# Patient Record
Sex: FEMALE | Race: WHITE | NOT HISPANIC OR LATINO | URBAN - METROPOLITAN AREA
[De-identification: names, ages, dates, MRNs, and addresses within clinical notes are randomized per-mention and may not be internally consistent; named-entity substitution may affect disease eponyms.]

---

## 2023-11-27 ENCOUNTER — APPOINTMENT (OUTPATIENT)
Dept: NEUROLOGY | Facility: CLINIC | Age: 38
End: 2023-11-27
Payer: COMMERCIAL

## 2023-12-30 DIAGNOSIS — K21.9 GASTROESOPHAGEAL REFLUX DISEASE WITHOUT ESOPHAGITIS: Primary | ICD-10-CM

## 2024-01-01 RX ORDER — OMEPRAZOLE 40 MG/1
40 CAPSULE, DELAYED RELEASE ORAL DAILY
Qty: 30 CAPSULE | Refills: 0 | Status: SHIPPED | OUTPATIENT
Start: 2024-01-01

## 2024-01-04 ENCOUNTER — SPECIALTY PHARMACY (OUTPATIENT)
Dept: PHARMACY | Facility: CLINIC | Age: 39
End: 2024-01-04

## 2024-01-04 PROCEDURE — RXMED WILLOW AMBULATORY MEDICATION CHARGE

## 2024-01-05 ENCOUNTER — PHARMACY VISIT (OUTPATIENT)
Dept: PHARMACY | Facility: CLINIC | Age: 39
End: 2024-01-05
Payer: MEDICAID

## 2024-01-18 ENCOUNTER — APPOINTMENT (OUTPATIENT)
Dept: NEUROLOGY | Facility: HOSPITAL | Age: 39
End: 2024-01-18
Payer: COMMERCIAL

## 2024-02-01 ENCOUNTER — SPECIALTY PHARMACY (OUTPATIENT)
Dept: PHARMACY | Facility: CLINIC | Age: 39
End: 2024-02-01

## 2024-02-01 ENCOUNTER — TELEMEDICINE (OUTPATIENT)
Dept: NEUROLOGY | Facility: HOSPITAL | Age: 39
End: 2024-02-01
Payer: COMMERCIAL

## 2024-02-01 DIAGNOSIS — G43.109 MIGRAINE WITH AURA AND WITHOUT STATUS MIGRAINOSUS, NOT INTRACTABLE: Primary | ICD-10-CM

## 2024-02-01 PROBLEM — N39.0 RECURRENT URINARY TRACT INFECTION: Status: ACTIVE | Noted: 2024-02-01

## 2024-02-01 PROBLEM — K21.9 GERD (GASTROESOPHAGEAL REFLUX DISEASE): Status: ACTIVE | Noted: 2024-02-01

## 2024-02-01 PROCEDURE — 99214 OFFICE O/P EST MOD 30 MIN: CPT | Mod: GT | Performed by: PSYCHIATRY & NEUROLOGY

## 2024-02-01 PROCEDURE — 99214 OFFICE O/P EST MOD 30 MIN: CPT | Performed by: PSYCHIATRY & NEUROLOGY

## 2024-02-01 RX ORDER — NITROFURANTOIN 25; 75 MG/1; MG/1
100 CAPSULE ORAL DAILY
COMMUNITY

## 2024-02-01 RX ORDER — ONDANSETRON 4 MG/1
4 TABLET, FILM COATED ORAL EVERY 8 HOURS PRN
Qty: 20 TABLET | Refills: 0 | Status: SHIPPED | OUTPATIENT
Start: 2024-02-01

## 2024-02-01 RX ORDER — DULOXETIN HYDROCHLORIDE 30 MG/1
30 CAPSULE, DELAYED RELEASE ORAL DAILY
COMMUNITY
End: 2024-02-01 | Stop reason: SDUPTHER

## 2024-02-01 RX ORDER — PROMETHAZINE HYDROCHLORIDE 25 MG/1
25 TABLET ORAL EVERY 8 HOURS PRN
Qty: 20 TABLET | Refills: 3 | Status: SHIPPED | OUTPATIENT
Start: 2024-02-01 | End: 2024-04-30 | Stop reason: SINTOL

## 2024-02-01 RX ORDER — DULOXETIN HYDROCHLORIDE 60 MG/1
60 CAPSULE, DELAYED RELEASE ORAL DAILY
Qty: 90 CAPSULE | Refills: 3 | Status: SHIPPED | OUTPATIENT
Start: 2024-02-01 | End: 2025-01-31
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
## 2024-02-01 NOTE — PATIENT INSTRUCTIONS
Increase the duloxetine/cymbalta to 60mg.  New prescription for 60mg tabs sent to [facility]  Use ondansetron/zofran for headache/nausea.  This is not significantly sedating and you should be able to work with this.  If nothing else is working use promethazine/phenergan.  This is VERY sedating. You can drive with this medication.  This is for when nothing is working and you just need to go to sleep.  Please make a follow up for  1 month.  Call [phone] to make an appointment.    Keep a calendar of your migraines.    Wear hair loose.      Exercise regularly, get plenty of fresh air and regular, sleep.    Preventative medication should be taken every day regardless of whether or not, you have a headache.  It is to prevent headaches.    Medication to treat the actual headache should be taken as soon as you realize you have a headache.    Medications such as Maxalt, Imitrex, Relpax, Zomig, can be taken only twice a day and they're only nine pills per month.  If you have frequent headaches may need to save these for the worst headaches.    Do not take pain medication every day.  Your body can become addicted to this medication and may make the headaches worse.  If you are having headaches every day, we need to increase the preventative medication, call me if this is becoming a problem.    Avoid foods that can cause migraines.  These include: Red wine, MSG, nitrates (hot dogs, or lunch meats), caffeine, chocolate, cheese.    Monosodium Glutamate (MSG)  MSG also known as Monosodium glutamate is a common food additive. MSG is used to make food taste better, and can be found in processed products, fast food, and canned soups. MSG can cause headaches when consumed in large amounts.    Fast Foods that Contain MSG  Ch[employer] Chicken [place]   [employer] Chicken's Extra Crispy Chicken Breast    Chips  (Will be listed on ingredient list Monosodium Glutamate)  [place]  Pringles  Potato Chips    Meats  Hot  dogs    Lunch Meats  Beef jerky    Sausages  Smoked meats    Pepperoni  Meat snack sticks    [place]  Patrasmi    Hamburgers  Cold cuts    Salami  Fried Chicken      Chicken Nuggets  Burgers    Sauces  Ketchup    Mayonnaise  Barbecue sauce   Salad dressing  Soy sauce    Mustard    Miscellaneous  Bodybuilding protein powder  Instant Ramen  Spices    Items that cause headaches without MSG  Alcohol (red wine, beer, whiskey, Scotch, and champagne )  Cheese (Aged Cheeses: including Parmesan, Swiss, Brie, Cheddar)    I can be reached at phone: [phone] or email: [email or web address]

## 2024-02-06 ENCOUNTER — OFFICE VISIT (OUTPATIENT)
Dept: PRIMARY CARE | Facility: CLINIC | Age: 39
End: 2024-02-06
Payer: COMMERCIAL

## 2024-02-06 VITALS
DIASTOLIC BLOOD PRESSURE: 66 MMHG | WEIGHT: 195 LBS | SYSTOLIC BLOOD PRESSURE: 136 MMHG | OXYGEN SATURATION: 97 % | BODY MASS INDEX: 33.47 KG/M2 | HEART RATE: 94 BPM

## 2024-02-06 DIAGNOSIS — Z00.00 WELL ADULT EXAM: ICD-10-CM

## 2024-02-06 DIAGNOSIS — E28.2 PCOS (POLYCYSTIC OVARIAN SYNDROME): ICD-10-CM

## 2024-02-06 DIAGNOSIS — J01.40 ACUTE NON-RECURRENT PANSINUSITIS: Primary | ICD-10-CM

## 2024-02-06 DIAGNOSIS — Z13.220 SCREENING FOR CHOLESTEROL LEVEL: ICD-10-CM

## 2024-02-06 DIAGNOSIS — Z00.00 GENERAL MEDICAL EXAM: ICD-10-CM

## 2024-02-06 DIAGNOSIS — E55.9 VITAMIN D DEFICIENCY: ICD-10-CM

## 2024-02-06 PROBLEM — N39.0 RECURRENT URINARY TRACT INFECTION: Status: RESOLVED | Noted: 2024-02-01 | Resolved: 2024-02-06

## 2024-02-06 PROCEDURE — 1036F TOBACCO NON-USER: CPT | Performed by: FAMILY MEDICINE

## 2024-02-06 PROCEDURE — 99214 OFFICE O/P EST MOD 30 MIN: CPT | Performed by: FAMILY MEDICINE

## 2024-02-06 RX ORDER — AMOXICILLIN AND CLAVULANATE POTASSIUM 875; 125 MG/1; MG/1
875 TABLET, FILM COATED ORAL 2 TIMES DAILY
Qty: 14 TABLET | Refills: 0 | Status: SHIPPED | OUTPATIENT
Start: 2024-02-06 | End: 2024-02-13

## 2024-02-06 ASSESSMENT — PATIENT HEALTH QUESTIONNAIRE - PHQ9
2. FEELING DOWN, DEPRESSED OR HOPELESS: NOT AT ALL
SUM OF ALL RESPONSES TO PHQ9 QUESTIONS 1 AND 2: 0
1. LITTLE INTEREST OR PLEASURE IN DOING THINGS: NOT AT ALL

## 2024-02-06 ASSESSMENT — PAIN SCALES - GENERAL: PAINLEVEL: 0-NO PAIN

## 2024-02-22 ENCOUNTER — LAB (OUTPATIENT)
Dept: LAB | Facility: LAB | Age: 39
End: 2024-02-22
Payer: COMMERCIAL

## 2024-02-22 DIAGNOSIS — E28.2 PCOS (POLYCYSTIC OVARIAN SYNDROME): ICD-10-CM

## 2024-02-22 DIAGNOSIS — Z13.220 SCREENING FOR CHOLESTEROL LEVEL: ICD-10-CM

## 2024-02-22 DIAGNOSIS — E55.9 VITAMIN D DEFICIENCY: ICD-10-CM

## 2024-02-22 DIAGNOSIS — Z00.00 WELL ADULT EXAM: ICD-10-CM

## 2024-02-22 DIAGNOSIS — Z00.00 GENERAL MEDICAL EXAM: ICD-10-CM

## 2024-02-22 LAB
25(OH)D3 SERPL-MCNC: 21 NG/ML (ref 31–100)
ALBUMIN SERPL-MCNC: 4.9 G/DL (ref 3.5–5)
ALP BLD-CCNC: 60 U/L (ref 35–125)
ALT SERPL-CCNC: 30 U/L (ref 5–40)
ANION GAP SERPL CALC-SCNC: 12 MMOL/L
AST SERPL-CCNC: 21 U/L (ref 5–40)
BASOPHILS # BLD AUTO: 0.04 X10*3/UL (ref 0–0.1)
BASOPHILS NFR BLD AUTO: 0.4 %
BILIRUB SERPL-MCNC: 0.5 MG/DL (ref 0.1–1.2)
BUN SERPL-MCNC: 15 MG/DL (ref 8–25)
CALCIUM SERPL-MCNC: 9.6 MG/DL (ref 8.5–10.4)
CHLORIDE SERPL-SCNC: 101 MMOL/L (ref 97–107)
CHOLEST SERPL-MCNC: 205 MG/DL (ref 133–200)
CHOLEST/HDLC SERPL: 4.5 {RATIO}
CO2 SERPL-SCNC: 28 MMOL/L (ref 24–31)
CREAT SERPL-MCNC: 0.9 MG/DL (ref 0.4–1.6)
EGFRCR SERPLBLD CKD-EPI 2021: 84 ML/MIN/1.73M*2
EOSINOPHIL # BLD AUTO: 0.1 X10*3/UL (ref 0–0.7)
EOSINOPHIL NFR BLD AUTO: 1 %
ERYTHROCYTE [DISTWIDTH] IN BLOOD BY AUTOMATED COUNT: 13.2 % (ref 11.5–14.5)
EST. AVERAGE GLUCOSE BLD GHB EST-MCNC: 108 MG/DL
GLUCOSE SERPL-MCNC: 101 MG/DL (ref 65–99)
HBA1C MFR BLD: 5.4 %
HCT VFR BLD AUTO: 45.8 % (ref 36–46)
HDLC SERPL-MCNC: 46 MG/DL
HGB BLD-MCNC: 15 G/DL (ref 12–16)
IMM GRANULOCYTES # BLD AUTO: 0.03 X10*3/UL (ref 0–0.7)
IMM GRANULOCYTES NFR BLD AUTO: 0.3 % (ref 0–0.9)
LDLC SERPL CALC-MCNC: 141 MG/DL (ref 65–130)
LYMPHOCYTES # BLD AUTO: 2.53 X10*3/UL (ref 1.2–4.8)
LYMPHOCYTES NFR BLD AUTO: 24.6 %
MCH RBC QN AUTO: 29.6 PG (ref 26–34)
MCHC RBC AUTO-ENTMCNC: 32.8 G/DL (ref 32–36)
MCV RBC AUTO: 90 FL (ref 80–100)
MONOCYTES # BLD AUTO: 0.59 X10*3/UL (ref 0.1–1)
MONOCYTES NFR BLD AUTO: 5.7 %
NEUTROPHILS # BLD AUTO: 7 X10*3/UL (ref 1.2–7.7)
NEUTROPHILS NFR BLD AUTO: 68 %
NRBC BLD-RTO: 0 /100 WBCS (ref 0–0)
PLATELET # BLD AUTO: 260 X10*3/UL (ref 150–450)
POTASSIUM SERPL-SCNC: 3.9 MMOL/L (ref 3.4–5.1)
PROT SERPL-MCNC: 7.6 G/DL (ref 5.9–7.9)
RBC # BLD AUTO: 5.07 X10*6/UL (ref 4–5.2)
SODIUM SERPL-SCNC: 141 MMOL/L (ref 133–145)
TRIGL SERPL-MCNC: 88 MG/DL (ref 40–150)
TSH SERPL DL<=0.05 MIU/L-ACNC: 1.13 MIU/L (ref 0.27–4.2)
WBC # BLD AUTO: 10.3 X10*3/UL (ref 4.4–11.3)

## 2024-02-22 PROCEDURE — 80053 COMPREHEN METABOLIC PANEL: CPT

## 2024-02-22 PROCEDURE — 84443 ASSAY THYROID STIM HORMONE: CPT

## 2024-02-22 PROCEDURE — 36415 COLL VENOUS BLD VENIPUNCTURE: CPT

## 2024-02-22 PROCEDURE — 82306 VITAMIN D 25 HYDROXY: CPT

## 2024-02-22 PROCEDURE — 84403 ASSAY OF TOTAL TESTOSTERONE: CPT

## 2024-02-22 PROCEDURE — 83036 HEMOGLOBIN GLYCOSYLATED A1C: CPT

## 2024-02-22 PROCEDURE — 83001 ASSAY OF GONADOTROPIN (FSH): CPT

## 2024-02-22 PROCEDURE — 83002 ASSAY OF GONADOTROPIN (LH): CPT

## 2024-02-22 PROCEDURE — 85025 COMPLETE CBC W/AUTO DIFF WBC: CPT

## 2024-02-22 PROCEDURE — 80061 LIPID PANEL: CPT

## 2024-02-23 ENCOUNTER — OFFICE VISIT (OUTPATIENT)
Dept: ENDOCRINOLOGY | Facility: CLINIC | Age: 39
End: 2024-02-23
Payer: COMMERCIAL

## 2024-02-23 VITALS
BODY MASS INDEX: 33.12 KG/M2 | WEIGHT: 194 LBS | HEIGHT: 64 IN | SYSTOLIC BLOOD PRESSURE: 144 MMHG | HEART RATE: 100 BPM | DIASTOLIC BLOOD PRESSURE: 96 MMHG

## 2024-02-23 DIAGNOSIS — E28.2 PCOS (POLYCYSTIC OVARIAN SYNDROME): Primary | ICD-10-CM

## 2024-02-23 DIAGNOSIS — L68.0 HIRSUTISM: ICD-10-CM

## 2024-02-23 LAB
FSH SERPL-ACNC: 5.9 IU/L
LH SERPL-ACNC: 17.3 IU/L
TESTOST SERPL-MCNC: <30 NG/DL (ref 0–70)

## 2024-02-23 PROCEDURE — 99214 OFFICE O/P EST MOD 30 MIN: CPT | Performed by: NURSE PRACTITIONER

## 2024-02-23 PROCEDURE — 1036F TOBACCO NON-USER: CPT | Performed by: NURSE PRACTITIONER

## 2024-02-23 RX ORDER — SPIRONOLACTONE 100 MG/1
100 TABLET, FILM COATED ORAL DAILY
Qty: 90 TABLET | Refills: 1 | Status: SHIPPED | OUTPATIENT
Start: 2024-02-23

## 2024-02-23 RX ORDER — METFORMIN HYDROCHLORIDE 500 MG/1
1000 TABLET, EXTENDED RELEASE ORAL
Qty: 360 TABLET | Refills: 2 | Status: SHIPPED | OUTPATIENT
Start: 2024-02-23

## 2024-02-23 ASSESSMENT — PATIENT HEALTH QUESTIONNAIRE - PHQ9
1. LITTLE INTEREST OR PLEASURE IN DOING THINGS: NOT AT ALL
2. FEELING DOWN, DEPRESSED OR HOPELESS: NOT AT ALL
SUM OF ALL RESPONSES TO PHQ9 QUESTIONS 1 AND 2: 0

## 2024-02-23 ASSESSMENT — ENCOUNTER SYMPTOMS: DEPRESSION: 0

## 2024-02-23 NOTE — PATIENT INSTRUCTIONS
METFORMIN START WITH 1 PILL ONCE  DAY WITH FOOD  THEN IN 1 WEEK TAKE 2 PILLS SAME TIME    2 WEEKS AFTER THAT IF YOU WANT TO ADD 3RD AND 4TH PILL WITH DINNER DO IT THE SAME WAY    SPIRONOLACTONE START IN 1 MONTH  1 WEEK AFTER TAKING GET YOUR BLOOD DRAWN

## 2024-02-23 NOTE — PROGRESS NOTES
HPI   New patient presents for PCOS management. 39 yo with history of Migraines, Anxiety/Depression, Hirsutism, diagnosed with PCOS about 10 years ago. Recalls taking Spironolactone, dose unsure, stopped because she did not think it helped with the hair growth. She does not have a period, IUD in place.      Current Outpatient Medications:     DULoxetine (Cymbalta) 60 mg DR capsule, Take 1 capsule (60 mg) by mouth once daily. Do not crush or chew., Disp: 90 capsule, Rfl: 3    nitrofurantoin, macrocrystal-monohydrate, (Macrobid) 100 mg capsule, Take 1 capsule (100 mg) by mouth once daily., Disp: , Rfl:     omeprazole (PriLOSEC) 40 mg DR capsule, TAKE ONE CAPSULE BY MOUTH EVERY DAY, Disp: 30 capsule, Rfl: 0    ondansetron (Zofran) 4 mg tablet, Take 1 tablet (4 mg) by mouth every 8 hours if needed for vomiting or nausea (migraine) for up to 20 doses., Disp: 20 tablet, Rfl: 0    promethazine (Phenergan) 25 mg tablet, Take 1 tablet (25 mg) by mouth every 8 hours if needed for nausea or vomiting for up to 80 doses., Disp: 20 tablet, Rfl: 3    rimegepant (NURTEC) 75 mg tablet,disintegrating, ALLOW 1 TABLET TO DISSOLVE ON OR UNDER THE TONGUE EVERY OTHER DAY, Disp: 16 tablet, Rfl: 11    metFORMIN  mg 24 hr tablet, Take 2 tablets (1,000 mg) by mouth 2 times a day with meals. Do not crush, chew, or split., Disp: 360 tablet, Rfl: 2    spironolactone (Aldactone) 100 mg tablet, Take 1 tablet (100 mg) by mouth once daily., Disp: 90 tablet, Rfl: 1      Allergies as of 02/23/2024    (No Known Allergies)         Review of Systems   Cardiology: Lightheadedness-denies.  Chest pain-denies.  Leg edema-denies.  Palpitations-denies.  Respiratory: Cough-denies. Shortness of breath-denies.  Wheezing-denies.  Gastroenterology: Constipation-denies.  Diarrhea-denies.  Heartburn-denies.  Endocrinology: Cold intolerance-denies.  Heat intolerance-denies.  Sweats-denies.  Neurology: Headache-denies.  Tremor-denies.  Neuropathy in  "extremities-denies.  Psychology: Low energy-denies.  Irritability-denies.  Sleep disturbances-denies.      BP (!) 144/96 (BP Location: Left arm, Patient Position: Sitting)   Pulse 100   Ht 1.626 m (5' 4\")   Wt 88 kg (194 lb)   BMI 33.30 kg/m²       Labs:  Lab Results   Component Value Date    WBC 10.3 02/22/2024    NRBC 0.0 02/22/2024    RBC 5.07 02/22/2024    HGB 15.0 02/22/2024    HCT 45.8 02/22/2024     02/22/2024     Lab Results   Component Value Date    CALCIUM 9.6 02/22/2024    AST 21 02/22/2024    ALKPHOS 60 02/22/2024    BILITOT 0.5 02/22/2024    PROT 7.6 02/22/2024    ALBUMIN 4.9 02/22/2024    GLOB 2.6 02/09/2023    AGR 1.8 02/09/2023     02/22/2024    K 3.9 02/22/2024     02/22/2024    CO2 28 02/22/2024    ANIONGAP 12 02/22/2024    BUN 15 02/22/2024    CREATININE 0.90 02/22/2024    UREACREAUR 19.0 02/09/2023    GLUCOSE 101 (H) 02/22/2024    ALT 30 02/22/2024    EGFR 84 02/22/2024     Lab Results   Component Value Date    CHOL 205 (H) 02/22/2024    TRIG 88 02/22/2024    HDL 46.0 (L) 02/22/2024    LDLCALC 141 (H) 02/22/2024     Lab Results   Component Value Date    TESTOSTERONE <30 02/22/2024    TSH 1.13 02/22/2024     Lab Results   Component Value Date    SDPQQNUY17 450 06/07/2021     Lab Results   Component Value Date    HGBA1C 5.4 02/22/2024         Physical Exam   General Appearance: pleasant, cooperative, no acute distress  HEENT: no chemosis, no proptosis, no lid lag, no lid retraction  Neck: no lymphadenopathy, no thyromegaly, no dominant thyroid nodules  Heart: no murmurs, regular rate and rhythm, S1 and S2  Lungs: no wheezes, no rhonci, no rales  Extremities: no lower extremity swelling      Assessment/Plan   1. PCOS (polycystic ovarian syndrome)  -will try Metformin and titrate to max dose if tolerated  -discussed possible side effects   -consider GLP1 therapy at follow up     - Referral to Endocrinology  - metFORMIN  mg 24 hr tablet; Take 2 tablets (1,000 mg) by mouth " 2 times a day with meals. Do not crush, chew, or split.  Dispense: 360 tablet; Refill: 2  - Basic metabolic panel; Future    2. Hirsutism  -facial hair, arms, back and chest  -will start after 3-4 weeks of Metformin   -check BMP 1 week after starting spironolactone    - spironolactone (Aldactone) 100 mg tablet; Take 1 tablet (100 mg) by mouth once daily.  Dispense: 90 tablet; Refill: 1     Follow Up: CNP 4 months    -labs/tests/notes reviewed  -reviewed and counseled patient on medication monitoring and side effects

## 2024-02-26 ENCOUNTER — SPECIALTY PHARMACY (OUTPATIENT)
Dept: PHARMACY | Facility: CLINIC | Age: 39
End: 2024-02-26

## 2024-02-26 PROBLEM — L68.0 HIRSUTISM: Status: ACTIVE | Noted: 2024-02-26

## 2024-02-26 PROBLEM — E28.2 PCOS (POLYCYSTIC OVARIAN SYNDROME): Status: ACTIVE | Noted: 2024-02-26

## 2024-03-07 ENCOUNTER — SPECIALTY PHARMACY (OUTPATIENT)
Dept: PHARMACY | Facility: CLINIC | Age: 39
End: 2024-03-07

## 2024-04-01 ENCOUNTER — SPECIALTY PHARMACY (OUTPATIENT)
Dept: PHARMACY | Facility: CLINIC | Age: 39
End: 2024-04-01

## 2024-04-26 ENCOUNTER — SPECIALTY PHARMACY (OUTPATIENT)
Dept: PHARMACY | Facility: CLINIC | Age: 39
End: 2024-04-26

## 2024-04-26 DIAGNOSIS — G43.109 MIGRAINE WITH AURA AND WITHOUT STATUS MIGRAINOSUS, NOT INTRACTABLE: Primary | ICD-10-CM

## 2024-04-27 DIAGNOSIS — G43.109 MIGRAINE WITH AURA AND WITHOUT STATUS MIGRAINOSUS, NOT INTRACTABLE: Primary | ICD-10-CM

## 2024-04-30 ENCOUNTER — TELEPHONE (OUTPATIENT)
Dept: PRIMARY CARE | Facility: CLINIC | Age: 39
End: 2024-04-30

## 2024-04-30 ENCOUNTER — OFFICE VISIT (OUTPATIENT)
Dept: PRIMARY CARE | Facility: CLINIC | Age: 39
End: 2024-04-30
Payer: COMMERCIAL

## 2024-04-30 VITALS
HEART RATE: 86 BPM | WEIGHT: 186 LBS | SYSTOLIC BLOOD PRESSURE: 124 MMHG | OXYGEN SATURATION: 96 % | TEMPERATURE: 97.7 F | DIASTOLIC BLOOD PRESSURE: 76 MMHG | BODY MASS INDEX: 31.93 KG/M2

## 2024-04-30 DIAGNOSIS — N39.0 RECURRENT URINARY TRACT INFECTION: Primary | ICD-10-CM

## 2024-04-30 DIAGNOSIS — R82.90 ABNORMAL URINALYSIS: ICD-10-CM

## 2024-04-30 DIAGNOSIS — Z72.51 RISK FOR SEXUALLY TRANSMITTED DISEASE: ICD-10-CM

## 2024-04-30 LAB
POC APPEARANCE, URINE: CLEAR
POC BILIRUBIN, URINE: ABNORMAL
POC BLOOD, URINE: ABNORMAL
POC COLOR, URINE: YELLOW
POC GLUCOSE, URINE: ABNORMAL MG/DL
POC KETONES, URINE: ABNORMAL MG/DL
POC LEUKOCYTES, URINE: ABNORMAL
POC NITRITE,URINE: POSITIVE
POC PH, URINE: 5 PH
POC PROTEIN, URINE: ABNORMAL MG/DL
POC SPECIFIC GRAVITY, URINE: 1.01
POC UROBILINOGEN, URINE: 4 EU/DL

## 2024-04-30 PROCEDURE — 87086 URINE CULTURE/COLONY COUNT: CPT

## 2024-04-30 PROCEDURE — 87186 SC STD MICRODIL/AGAR DIL: CPT

## 2024-04-30 PROCEDURE — 99213 OFFICE O/P EST LOW 20 MIN: CPT

## 2024-04-30 PROCEDURE — 81003 URINALYSIS AUTO W/O SCOPE: CPT

## 2024-04-30 PROCEDURE — 1036F TOBACCO NON-USER: CPT

## 2024-04-30 RX ORDER — SULFAMETHOXAZOLE AND TRIMETHOPRIM 800; 160 MG/1; MG/1
1 TABLET ORAL 2 TIMES DAILY
Qty: 14 TABLET | Refills: 0 | Status: SHIPPED | OUTPATIENT
Start: 2024-04-30 | End: 2024-05-07

## 2024-04-30 ASSESSMENT — ENCOUNTER SYMPTOMS
VOMITING: 0
FLANK PAIN: 0
ACTIVITY CHANGE: 0
NAUSEA: 0
FEVER: 0
DIFFICULTY URINATING: 1
APPETITE CHANGE: 0
SWEATS: 0
HEMATURIA: 0
FREQUENCY: 1
CHILLS: 0
FATIGUE: 0
DYSURIA: 1

## 2024-04-30 ASSESSMENT — PATIENT HEALTH QUESTIONNAIRE - PHQ9
2. FEELING DOWN, DEPRESSED OR HOPELESS: NOT AT ALL
1. LITTLE INTEREST OR PLEASURE IN DOING THINGS: NOT AT ALL
SUM OF ALL RESPONSES TO PHQ9 QUESTIONS 1 AND 2: 0

## 2024-04-30 ASSESSMENT — PAIN SCALES - GENERAL: PAINLEVEL: 6

## 2024-04-30 NOTE — PROGRESS NOTES
Subjective   Patient ID: Stefany Glover is a 38 y.o. female who presents for UTI.    Stefany present for concernt of UTI.  This is a chronic issue for her.  Symptoms began 3 days ago.  She started to take cranberry capsules and Azo for symptoms but has had no relief.   Has seen Dr. Plaza (urology) in the past, prescribed her Macrobid 100 mg daily for many years.    UTI   This is a chronic problem. Episode onset: 3 days ago. The problem occurs every urination. The problem has been gradually worsening. The quality of the pain is described as burning. The pain is at a severity of 6/10 (pain is 10/10 with urination). The pain is moderate. There has been no fever. She is Sexually active (Some risk for STIs). There is A history of pyelonephritis. Associated symptoms include frequency and urgency. Pertinent negatives include no chills, discharge, flank pain, hematuria, hesitancy, nausea, possible pregnancy, sweats or vomiting. She has tried antibiotics (Azo) for the symptoms. The treatment provided mild relief. Her past medical history is significant for recurrent UTIs.        Review of Systems   Constitutional:  Negative for activity change, appetite change, chills, fatigue and fever.   Gastrointestinal:  Negative for nausea and vomiting.   Genitourinary:  Positive for difficulty urinating, dysuria, frequency and urgency. Negative for decreased urine volume, dyspareunia, flank pain, genital sores, hematuria, hesitancy, pelvic pain, vaginal bleeding, vaginal discharge and vaginal pain.       Objective   /76 (BP Location: Left arm)   Pulse 86   Temp 36.5 °C (97.7 °F) (Temporal)   Wt 84.4 kg (186 lb)   SpO2 96%   BMI 31.93 kg/m²     Physical Exam  Vitals and nursing note reviewed.   Constitutional:       General: She is not in acute distress.     Appearance: Normal appearance. She is normal weight.   Cardiovascular:      Rate and Rhythm: Normal rate and regular rhythm.      Heart sounds: Normal heart sounds,  S1 normal and S2 normal.   Pulmonary:      Effort: Pulmonary effort is normal.      Breath sounds: Normal breath sounds.   Abdominal:      General: Abdomen is flat. Bowel sounds are normal. There is no distension.      Palpations: Abdomen is soft. There is no hepatomegaly or splenomegaly.      Tenderness: There is no abdominal tenderness. There is no right CVA tenderness, left CVA tenderness, guarding or rebound.   Skin:     General: Skin is warm and dry.      Capillary Refill: Capillary refill takes less than 2 seconds.   Neurological:      General: No focal deficit present.      Mental Status: She is alert.         Assessment/Plan   Problem List Items Addressed This Visit    None  Visit Diagnoses         Codes    Recurrent urinary tract infection    -  Primary  Acute.  Urine dipstick: +leukocytes, +nitrates  Urine sent for C & S, will call with results and adjust treatment accordingly.  Start Bactrim DS BID x 7 days, hold daily nitrofurantion as discussed.  - please complete full course unless you hear otherwise from our office.  Discussed indication for antibiotic use, administration instructions, and adverse effects with patient.  Increase your fluid intake, Tylenol or Motrin as needed for pain or fever.  May use OTC AZO/UROSTAT/Cystex for symptoms relief if desired.   Discussed hygiene for prevention.  Call our office to report and new fever/chills or back pain.  Follow up in 1 week if symptoms persist.    N39.0    Relevant Medications    sulfamethoxazole-trimethoprim (Bactrim DS) 800-160 mg tablet    Other Relevant Orders    POCT UA Automated manually resulted (Completed)    Urine Culture    Abnormal urinalysis     R82.90    Relevant Orders    Urine Culture    Risk for sexually transmitted disease      Patient has some concerns for STIs as she has had unprotected intercourse with a partner whom she is not in a committed relationship.  Will complete STI screenings and follow up with results.  Z72.51    Relevant  Orders    C. Trachomatis / N. Gonorrhoeae, Amplified Detection    Hepatitis C Antibody    HIV 1/2 Antigen/Antibody Screen with Reflex to Confirmation    Syphilis Screen with Reflex

## 2024-05-03 LAB — BACTERIA UR CULT: ABNORMAL

## 2024-05-10 ENCOUNTER — SPECIALTY PHARMACY (OUTPATIENT)
Dept: PHARMACY | Facility: CLINIC | Age: 39
End: 2024-05-10

## 2024-05-13 PROCEDURE — RXMED WILLOW AMBULATORY MEDICATION CHARGE

## 2024-05-16 ENCOUNTER — SPECIALTY PHARMACY (OUTPATIENT)
Dept: PHARMACY | Facility: CLINIC | Age: 39
End: 2024-05-16

## 2024-05-18 ENCOUNTER — PHARMACY VISIT (OUTPATIENT)
Dept: PHARMACY | Facility: CLINIC | Age: 39
End: 2024-05-18
Payer: COMMERCIAL

## 2024-06-18 ENCOUNTER — SPECIALTY PHARMACY (OUTPATIENT)
Dept: PHARMACY | Facility: CLINIC | Age: 39
End: 2024-06-18

## 2024-06-18 PROCEDURE — RXMED WILLOW AMBULATORY MEDICATION CHARGE

## 2024-06-19 ENCOUNTER — PHARMACY VISIT (OUTPATIENT)
Dept: PHARMACY | Facility: CLINIC | Age: 39
End: 2024-06-19
Payer: COMMERCIAL

## 2024-07-01 ENCOUNTER — APPOINTMENT (OUTPATIENT)
Dept: ENDOCRINOLOGY | Facility: CLINIC | Age: 39
End: 2024-07-01
Payer: COMMERCIAL

## 2024-07-01 VITALS
SYSTOLIC BLOOD PRESSURE: 130 MMHG | BODY MASS INDEX: 31.58 KG/M2 | HEART RATE: 69 BPM | WEIGHT: 184 LBS | DIASTOLIC BLOOD PRESSURE: 89 MMHG

## 2024-07-01 DIAGNOSIS — L68.0 HIRSUTISM: Primary | ICD-10-CM

## 2024-07-01 DIAGNOSIS — E28.2 PCOS (POLYCYSTIC OVARIAN SYNDROME): ICD-10-CM

## 2024-07-01 PROCEDURE — 1036F TOBACCO NON-USER: CPT | Performed by: NURSE PRACTITIONER

## 2024-07-01 PROCEDURE — 99213 OFFICE O/P EST LOW 20 MIN: CPT | Performed by: NURSE PRACTITIONER

## 2024-07-01 RX ORDER — SPIRONOLACTONE 100 MG/1
100 TABLET, FILM COATED ORAL DAILY
Qty: 90 TABLET | Refills: 3 | Status: SHIPPED | OUTPATIENT
Start: 2024-07-01

## 2024-07-01 ASSESSMENT — PATIENT HEALTH QUESTIONNAIRE - PHQ9
1. LITTLE INTEREST OR PLEASURE IN DOING THINGS: NOT AT ALL
2. FEELING DOWN, DEPRESSED OR HOPELESS: NOT AT ALL
SUM OF ALL RESPONSES TO PHQ9 QUESTIONS 1 & 2: 0

## 2024-07-01 ASSESSMENT — PAIN SCALES - GENERAL: PAINLEVEL: 0-NO PAIN

## 2024-07-01 NOTE — PROGRESS NOTES
HPI   Presents for follow up PCOS management. 40 yo with history of Migraines, Anxiety/Depression, Hirsutism, diagnosed with PCOS in her 20's. Recalls taking Spironolactone, dose unsure, stopped because she did not think it helped with the hair growth. She does not have a period, IUD in place.  Currently tolerating max dose Metformin ER, has lost 11 pounds.      Current Outpatient Medications:     DULoxetine (Cymbalta) 60 mg DR capsule, Take 1 capsule (60 mg) by mouth once daily. Do not crush or chew., Disp: 90 capsule, Rfl: 3    metFORMIN  mg 24 hr tablet, Take 2 tablets (1,000 mg) by mouth 2 times a day with meals. Do not crush, chew, or split., Disp: 360 tablet, Rfl: 2    nitrofurantoin, macrocrystal-monohydrate, (Macrobid) 100 mg capsule, Take 1 capsule (100 mg) by mouth once daily., Disp: , Rfl:     omeprazole (PriLOSEC) 40 mg DR capsule, TAKE ONE CAPSULE BY MOUTH EVERY DAY, Disp: 30 capsule, Rfl: 0    ondansetron (Zofran) 4 mg tablet, Take 1 tablet (4 mg) by mouth every 8 hours if needed for vomiting or nausea (migraine) for up to 20 doses., Disp: 20 tablet, Rfl: 0    rimegepant (Nurtec ODT) 75 mg tablet,disintegrating, Take 1 tablet (75 mg) by mouth every other day., Disp: 45 tablet, Rfl: 3    spironolactone (Aldactone) 100 mg tablet, Take 1 tablet (100 mg) by mouth once daily. (Patient not taking: Reported on 7/1/2024), Disp: 90 tablet, Rfl: 1      Allergies as of 07/01/2024    (No Known Allergies)         Review of Systems   Cardiology: Lightheadedness-denies.  Chest pain-denies.  Leg edema-denies.  Palpitations-denies.  Respiratory: Cough-denies. Shortness of breath-denies.  Wheezing-denies.  Gastroenterology: Constipation-denies.  Diarrhea-denies.  Heartburn-denies.  Endocrinology: Cold intolerance-denies.  Heat intolerance-denies.  Sweats-denies.  Neurology: Headache-denies.  Tremor-denies.  Neuropathy in extremities-denies.  Psychology: Low energy-denies.  Irritability-denies.  Sleep  disturbances-denies.      /89 (BP Location: Left arm, Patient Position: Sitting)   Pulse 69   Wt 83.5 kg (184 lb)   BMI 31.58 kg/m²       Labs:  Lab Results   Component Value Date    WBC 10.3 02/22/2024    NRBC 0.0 02/22/2024    RBC 5.07 02/22/2024    HGB 15.0 02/22/2024    HCT 45.8 02/22/2024     02/22/2024     Lab Results   Component Value Date    CALCIUM 9.6 02/22/2024    AST 21 02/22/2024    ALKPHOS 60 02/22/2024    BILITOT 0.5 02/22/2024    PROT 7.6 02/22/2024    ALBUMIN 4.9 02/22/2024    GLOB 2.6 02/09/2023    AGR 1.8 02/09/2023     02/22/2024    K 3.9 02/22/2024     02/22/2024    CO2 28 02/22/2024    ANIONGAP 12 02/22/2024    BUN 15 02/22/2024    CREATININE 0.90 02/22/2024    UREACREAUR 19.0 02/09/2023    GLUCOSE 101 (H) 02/22/2024    ALT 30 02/22/2024    EGFR 84 02/22/2024     Lab Results   Component Value Date    CHOL 205 (H) 02/22/2024    TRIG 88 02/22/2024    HDL 46.0 (L) 02/22/2024    LDLCALC 141 (H) 02/22/2024     Lab Results   Component Value Date    TESTOSTERONE <30 02/22/2024    TSH 1.13 02/22/2024     Lab Results   Component Value Date    [id number] 450 06/07/2021     Lab Results   Component Value Date    HGBA1C 5.4 02/22/2024         Physical Exam   General Appearance: pleasant, cooperative, no acute distress  HEENT: no chemosis, no proptosis, no lid lag, no lid retraction  Neck: no lymphadenopathy, no thyromegaly, no dominant thyroid nodules  Heart: no murmurs, regular rate and rhythm, S1 and S2  Lungs: no wheezes, no rhonci, no rales  Extremities: no lower extremity swelling      Assessment/Plan     1. Hirsutism  -will resume medication, refill sent  -check lab 1 week after starting    - spironolactone (Aldactone) 100 mg tablet; Take 1 tablet (100 mg) by mouth once daily.  Dispense: 90 tablet; Refill: 3    2. PCOS (polycystic ovarian syndrome)  -continue Metformin  -can try 1 gm tablet if she wants  - Basic metabolic panel; Future    Follow Up: CNP 6  months    -labs/tests/notes reviewed  -reviewed and counseled patient on medication monitoring and side effects

## 2024-07-10 ENCOUNTER — APPOINTMENT (OUTPATIENT)
Dept: PRIMARY CARE | Facility: CLINIC | Age: 39
End: 2024-07-10
Payer: COMMERCIAL

## 2024-07-17 ENCOUNTER — OFFICE VISIT (OUTPATIENT)
Dept: PRIMARY CARE | Facility: CLINIC | Age: 39
End: 2024-07-17
Payer: COMMERCIAL

## 2024-07-17 ENCOUNTER — SPECIALTY PHARMACY (OUTPATIENT)
Dept: PHARMACY | Facility: CLINIC | Age: 39
End: 2024-07-17

## 2024-07-17 VITALS
SYSTOLIC BLOOD PRESSURE: 126 MMHG | TEMPERATURE: 98.1 F | DIASTOLIC BLOOD PRESSURE: 80 MMHG | BODY MASS INDEX: 31.58 KG/M2 | OXYGEN SATURATION: 97 % | HEART RATE: 102 BPM | WEIGHT: 185 LBS | HEIGHT: 64 IN

## 2024-07-17 DIAGNOSIS — R52 BODY ACHES: Primary | ICD-10-CM

## 2024-07-17 DIAGNOSIS — J02.9 SORE THROAT: ICD-10-CM

## 2024-07-17 LAB
POC BINAX EXPIRATION: NORMAL
POC BINAX NOW COVID SERIAL NUMBER: NORMAL
POC RAPID STREP: NEGATIVE
POC SARS-COV-2 AG BINAX: NORMAL

## 2024-07-17 PROCEDURE — 3008F BODY MASS INDEX DOCD: CPT

## 2024-07-17 PROCEDURE — 1036F TOBACCO NON-USER: CPT

## 2024-07-17 PROCEDURE — 87880 STREP A ASSAY W/OPTIC: CPT

## 2024-07-17 PROCEDURE — 99213 OFFICE O/P EST LOW 20 MIN: CPT

## 2024-07-17 PROCEDURE — 87811 SARS-COV-2 COVID19 W/OPTIC: CPT

## 2024-07-17 ASSESSMENT — ENCOUNTER SYMPTOMS
FLU SYMPTOMS: 1
SHORTNESS OF BREATH: 0
DIZZINESS: 0
COUGH: 1
FEVER: 1
LIGHT-HEADEDNESS: 0
RHINORRHEA: 0
SORE THROAT: 1
VOMITING: 0
SINUS PAIN: 0
CHILLS: 1
FATIGUE: 1
MYALGIAS: 1

## 2024-07-17 ASSESSMENT — COLUMBIA-SUICIDE SEVERITY RATING SCALE - C-SSRS: 1. IN THE PAST MONTH, HAVE YOU WISHED YOU WERE DEAD OR WISHED YOU COULD GO TO SLEEP AND NOT WAKE UP?: NO

## 2024-07-17 ASSESSMENT — PATIENT HEALTH QUESTIONNAIRE - PHQ9
1. LITTLE INTEREST OR PLEASURE IN DOING THINGS: NOT AT ALL
SUM OF ALL RESPONSES TO PHQ9 QUESTIONS 1 AND 2: 0
2. FEELING DOWN, DEPRESSED OR HOPELESS: NOT AT ALL

## 2024-07-17 ASSESSMENT — PAIN SCALES - GENERAL: PAINLEVEL: 0-NO PAIN

## 2024-07-17 ASSESSMENT — LIFESTYLE VARIABLES
HOW MANY STANDARD DRINKS CONTAINING ALCOHOL DO YOU HAVE ON A TYPICAL DAY: 1 OR 2
AUDIT-C TOTAL SCORE: 1
SKIP TO QUESTIONS 9-10: 1
HOW OFTEN DO YOU HAVE SIX OR MORE DRINKS ON ONE OCCASION: NEVER
HOW OFTEN DO YOU HAVE A DRINK CONTAINING ALCOHOL: MONTHLY OR LESS

## 2024-07-17 NOTE — PROGRESS NOTES
"Subjective   Patient ID: Stefany Glover is a 39 y.o. female who presents for Generalized Body Aches (Sore throat and ear pain/Pt did not covid test /X 3 days ).    Flu Symptoms  This is a new problem. The current episode started in the past 7 days. The problem has been gradually worsening. Associated symptoms include chills, coughing, fatigue, a fever, myalgias and a sore throat. Pertinent negatives include no chest pain, congestion or vomiting. Nothing aggravates the symptoms. She has tried NSAIDs, drinking and sleep for the symptoms. The treatment provided mild relief.   Recently at a week long festival. No known encounter with Strep, mono.     Review of Systems   Constitutional:  Positive for chills, fatigue and fever.   HENT:  Positive for ear pain and sore throat. Negative for congestion, rhinorrhea, sinus pain and sneezing.    Respiratory:  Positive for cough. Negative for shortness of breath.    Cardiovascular:  Negative for chest pain.   Gastrointestinal:  Negative for vomiting.   Musculoskeletal:  Positive for myalgias.   Neurological:  Negative for dizziness and light-headedness.       Objective   /80   Pulse 102   Temp 36.7 °C (98.1 °F)   Ht 1.626 m (5' 4\")   Wt 83.9 kg (185 lb)   SpO2 97%   BMI 31.76 kg/m²     Physical Exam  Vitals and nursing note reviewed.   Constitutional:       General: She is not in acute distress.  HENT:      Right Ear: Ear canal normal.      Left Ear: Ear canal normal.      Ears:      Comments: Cloudiness bilateral TMs     Nose: Nose normal.      Mouth/Throat:      Mouth: Mucous membranes are moist.      Pharynx: Oropharyngeal exudate and posterior oropharyngeal erythema present.   Eyes:      Extraocular Movements: Extraocular movements intact.      Conjunctiva/sclera: Conjunctivae normal.   Cardiovascular:      Rate and Rhythm: Normal rate and regular rhythm.   Pulmonary:      Effort: Pulmonary effort is normal.      Breath sounds: Normal breath sounds.   Abdominal: "      Tenderness: There is no abdominal tenderness.   Musculoskeletal:      Cervical back: Tenderness present.   Lymphadenopathy:      Cervical: Cervical adenopathy present.   Neurological:      General: No focal deficit present.      Mental Status: She is alert.   Psychiatric:         Mood and Affect: Mood normal.         Assessment/Plan   Problem List Items Addressed This Visit    None  Visit Diagnoses         Codes    Body aches     R52    Relevant Orders    POCT BinaxNOW Covid-19 Ag Card manually resulted (Completed)    Sore throat  Acute. POCT Covid, Strep negative. Likely viral, provided reassurance. Discussed possibility of Mono, patient declines testing at this time.   OTC Tylenol/Ibuprofen as directed for throat pain, aches, fever. OTC Flonase as directed for sinus pressure, ear fullness. Increase fluids, salt water gargles, rest.   Follow up if symptoms do not improve within 7 days, or sooner for worsening.  J02.9    Relevant Orders    POCT BinaxNOW Covid-19 Ag Card manually resulted (Completed)    POCT Rapid Strep A manually resulted (Completed)

## 2024-08-12 ENCOUNTER — SPECIALTY PHARMACY (OUTPATIENT)
Dept: PHARMACY | Facility: CLINIC | Age: 39
End: 2024-08-12

## 2024-08-28 ENCOUNTER — LAB (OUTPATIENT)
Dept: LAB | Facility: LAB | Age: 39
End: 2024-08-28
Payer: COMMERCIAL

## 2024-08-28 ENCOUNTER — APPOINTMENT (OUTPATIENT)
Dept: PRIMARY CARE | Facility: CLINIC | Age: 39
End: 2024-08-28
Payer: COMMERCIAL

## 2024-08-28 VITALS
OXYGEN SATURATION: 99 % | HEART RATE: 88 BPM | HEIGHT: 64 IN | BODY MASS INDEX: 32.44 KG/M2 | DIASTOLIC BLOOD PRESSURE: 90 MMHG | SYSTOLIC BLOOD PRESSURE: 124 MMHG | WEIGHT: 190 LBS

## 2024-08-28 DIAGNOSIS — K21.9 GASTROESOPHAGEAL REFLUX DISEASE WITHOUT ESOPHAGITIS: ICD-10-CM

## 2024-08-28 DIAGNOSIS — E28.2 PCOS (POLYCYSTIC OVARIAN SYNDROME): ICD-10-CM

## 2024-08-28 DIAGNOSIS — Z00.00 WELL ADULT EXAM: Primary | ICD-10-CM

## 2024-08-28 DIAGNOSIS — G43.109 MIGRAINE WITH AURA AND WITHOUT STATUS MIGRAINOSUS, NOT INTRACTABLE: ICD-10-CM

## 2024-08-28 DIAGNOSIS — Z72.51 RISK FOR SEXUALLY TRANSMITTED DISEASE: ICD-10-CM

## 2024-08-28 LAB
ANION GAP SERPL CALC-SCNC: 12 MMOL/L
BUN SERPL-MCNC: 11 MG/DL (ref 8–25)
CALCIUM SERPL-MCNC: 9.9 MG/DL (ref 8.5–10.4)
CHLORIDE SERPL-SCNC: 107 MMOL/L (ref 97–107)
CO2 SERPL-SCNC: 26 MMOL/L (ref 24–31)
CREAT SERPL-MCNC: 0.9 MG/DL (ref 0.4–1.6)
EGFRCR SERPLBLD CKD-EPI 2021: 84 ML/MIN/1.73M*2
GLUCOSE SERPL-MCNC: 99 MG/DL (ref 65–99)
POTASSIUM SERPL-SCNC: 4 MMOL/L (ref 3.4–5.1)
SODIUM SERPL-SCNC: 145 MMOL/L (ref 133–145)

## 2024-08-28 PROCEDURE — 90715 TDAP VACCINE 7 YRS/> IM: CPT | Performed by: FAMILY MEDICINE

## 2024-08-28 PROCEDURE — 90471 IMMUNIZATION ADMIN: CPT | Performed by: FAMILY MEDICINE

## 2024-08-28 PROCEDURE — 80048 BASIC METABOLIC PNL TOTAL CA: CPT

## 2024-08-28 PROCEDURE — 87389 HIV-1 AG W/HIV-1&-2 AB AG IA: CPT

## 2024-08-28 PROCEDURE — 86803 HEPATITIS C AB TEST: CPT

## 2024-08-28 PROCEDURE — 87491 CHLMYD TRACH DNA AMP PROBE: CPT

## 2024-08-28 PROCEDURE — 99395 PREV VISIT EST AGE 18-39: CPT | Performed by: FAMILY MEDICINE

## 2024-08-28 PROCEDURE — 36415 COLL VENOUS BLD VENIPUNCTURE: CPT

## 2024-08-28 PROCEDURE — 86780 TREPONEMA PALLIDUM: CPT

## 2024-08-28 PROCEDURE — 3008F BODY MASS INDEX DOCD: CPT | Performed by: FAMILY MEDICINE

## 2024-08-28 PROCEDURE — 87591 N.GONORRHOEAE DNA AMP PROB: CPT

## 2024-08-28 ASSESSMENT — PAIN SCALES - GENERAL: PAINLEVEL: 0-NO PAIN

## 2024-08-29 LAB
C TRACH RRNA SPEC QL NAA+PROBE: NEGATIVE
HCV AB SER QL: NONREACTIVE
HIV 1+2 AB+HIV1 P24 AG SERPL QL IA: NONREACTIVE
N GONORRHOEA DNA SPEC QL PROBE+SIG AMP: NEGATIVE
TREPONEMA PALLIDUM IGG+IGM AB [PRESENCE] IN SERUM OR PLASMA BY IMMUNOASSAY: NONREACTIVE

## 2024-09-02 ASSESSMENT — ENCOUNTER SYMPTOMS
NAUSEA: 0
SHORTNESS OF BREATH: 0
NERVOUS/ANXIOUS: 0
CHILLS: 0
MYALGIAS: 0
COUGH: 0
ARTHRALGIAS: 0
WEAKNESS: 0
SORE THROAT: 0
NUMBNESS: 0
UNEXPECTED WEIGHT CHANGE: 0
FEVER: 0
DIZZINESS: 0
RHINORRHEA: 0
VOMITING: 0
BLOOD IN STOOL: 0
DYSPHORIC MOOD: 0
HEMATURIA: 0
DYSURIA: 0
TROUBLE SWALLOWING: 0
ABDOMINAL PAIN: 0

## 2024-09-02 NOTE — ASSESSMENT & PLAN NOTE
Controlled.  Continue omeprazole 40 mg daily.  Unable to wean off in the past due to recurrence of symptoms and therefore will continue.  Avoid dietary triggers

## 2024-09-02 NOTE — ASSESSMENT & PLAN NOTE
Continue follow-up with neurology.  Well-controlled on current medication management.  Avoid triggers

## 2024-09-02 NOTE — PROGRESS NOTES
"Subjective   Patient ID: Stefany Glover is a 39 y.o. female who presents for CPE.    HPI  Patient Care Team:  Taylor López MD as PCP - General (Family Medicine)  NABILA Mtz-CNP as PCP - [facility] PCP    Stefany Glover is seen for comprehensive physical exam.  PMH, PSH, family history and social history were reviewed and updated.  GYN - Pap 2019 normal with negative HPV, sees Dr. Villalba  Migraines - seeing Dr. Swain, doing well,   GERD - taking omeprazole,     Review of Systems   Constitutional:  Negative for chills, fever and unexpected weight change.   HENT:  Negative for congestion, ear pain, hearing loss, rhinorrhea, sore throat and trouble swallowing.    Eyes:  Negative for visual disturbance.   Respiratory:  Negative for cough and shortness of breath.    Cardiovascular:  Negative for chest pain and leg swelling.   Gastrointestinal:  Negative for abdominal pain, blood in stool, nausea and vomiting.   Genitourinary:  Negative for dysuria, hematuria, vaginal bleeding and vaginal discharge.   Musculoskeletal:  Negative for arthralgias and myalgias.   Skin:  Negative for rash.   Neurological:  Negative for dizziness, weakness and numbness.   Psychiatric/Behavioral:  Negative for dysphoric mood. The patient is not nervous/anxious.        Objective   /90   Pulse 88   Ht 1.626 m (5' 4\")   Wt 86.2 kg (190 lb)   LMP  (LMP Unknown)   SpO2 99%   BMI 32.61 kg/m²     Physical Exam  Vitals and nursing note reviewed.   Constitutional:       General: She is not in acute distress.  HENT:      Right Ear: Tympanic membrane and ear canal normal.      Left Ear: Tympanic membrane and ear canal normal.      Nose: Nose normal.      Mouth/Throat:      Mouth: Mucous membranes are moist.   Eyes:      Extraocular Movements: Extraocular movements intact.      Pupils: Pupils are equal, round, and reactive to light.   Cardiovascular:      Rate and Rhythm: Normal rate and regular rhythm.   Pulmonary:     "  Effort: Pulmonary effort is normal.      Breath sounds: Normal breath sounds.   Abdominal:      General: Abdomen is flat.      Palpations: Abdomen is soft.      Tenderness: There is no abdominal tenderness.   Musculoskeletal:         General: Normal range of motion.   Lymphadenopathy:      Cervical: No cervical adenopathy.   Skin:     General: Skin is warm.      Findings: No rash.   Neurological:      General: No focal deficit present.      Mental Status: She is alert.   Psychiatric:         Mood and Affect: Mood normal.         Assessment/Plan   Assessment & Plan  Well adult exam  Preventative measures discussed in detail.  Immunizations reviewed and discussed.         Gastroesophageal reflux disease without esophagitis  Controlled.  Continue omeprazole 40 mg daily.  Unable to wean off in the past due to recurrence of symptoms and therefore will continue.  Avoid dietary triggers       Migraine with aura and without status migrainosus, not intractable  Continue follow-up with neurology.  Well-controlled on current medication management.  Avoid triggers           Follow up  1 year , sooner with any problems or concerns.

## 2024-09-30 ENCOUNTER — SPECIALTY PHARMACY (OUTPATIENT)
Dept: PHARMACY | Facility: CLINIC | Age: 39
End: 2024-09-30

## 2024-09-30 PROCEDURE — RXMED WILLOW AMBULATORY MEDICATION CHARGE

## 2024-10-01 ENCOUNTER — PHARMACY VISIT (OUTPATIENT)
Dept: PHARMACY | Facility: CLINIC | Age: 39
End: 2024-10-01
Payer: COMMERCIAL

## 2024-10-08 ENCOUNTER — TELEPHONE (OUTPATIENT)
Dept: ENDOCRINOLOGY | Facility: CLINIC | Age: 39
End: 2024-10-08
Payer: COMMERCIAL

## 2024-10-08 NOTE — TELEPHONE ENCOUNTER
Stefany Glover   1985   [id number]   [phone]       Called and spoke to patient in regards to canceling 1/2/25 appt with CNP and need to be rescheduled with MD due to provider is leaving the practice.

## 2024-12-13 ENCOUNTER — SPECIALTY PHARMACY (OUTPATIENT)
Dept: PHARMACY | Facility: CLINIC | Age: 39
End: 2024-12-13

## 2024-12-13 PROCEDURE — RXMED WILLOW AMBULATORY MEDICATION CHARGE

## 2024-12-14 ENCOUNTER — PHARMACY VISIT (OUTPATIENT)
Dept: PHARMACY | Facility: CLINIC | Age: 39
End: 2024-12-14
Payer: COMMERCIAL

## 2025-01-02 ENCOUNTER — APPOINTMENT (OUTPATIENT)
Dept: ENDOCRINOLOGY | Facility: CLINIC | Age: 40
End: 2025-01-02
Payer: COMMERCIAL

## 2025-01-15 PROCEDURE — 87624 HPV HI-RISK TYP POOLED RSLT: CPT

## 2025-01-15 PROCEDURE — 88175 CYTOPATH C/V AUTO FLUID REDO: CPT

## 2025-01-16 ENCOUNTER — LAB REQUISITION (OUTPATIENT)
Dept: LAB | Facility: HOSPITAL | Age: 40
End: 2025-01-16
Payer: COMMERCIAL

## 2025-01-16 DIAGNOSIS — Z01.419 ENCOUNTER FOR GYNECOLOGICAL EXAMINATION (GENERAL) (ROUTINE) WITHOUT ABNORMAL FINDINGS: ICD-10-CM

## 2025-01-16 DIAGNOSIS — Z12.4 ENCOUNTER FOR SCREENING FOR MALIGNANT NEOPLASM OF CERVIX: ICD-10-CM

## 2025-01-16 DIAGNOSIS — Z11.51 ENCOUNTER FOR SCREENING FOR HUMAN PAPILLOMAVIRUS (HPV): ICD-10-CM

## 2025-01-23 ENCOUNTER — TELEPHONE (OUTPATIENT)
Dept: ENDOCRINOLOGY | Facility: CLINIC | Age: 40
End: 2025-01-23
Payer: COMMERCIAL

## 2025-01-23 NOTE — TELEPHONE ENCOUNTER
Stefany Glover   1985   [id number]   [phone]       Called patient and left voice message in regard to canceling 1/24/25 appt due to provider not in the office. Patient can be rescheduled for today, another day at 230pm, or double book at 4pm per MD.

## 2025-01-24 ENCOUNTER — APPOINTMENT (OUTPATIENT)
Dept: ENDOCRINOLOGY | Facility: CLINIC | Age: 40
End: 2025-01-24
Payer: COMMERCIAL

## 2025-01-24 LAB
CYTOLOGY CMNT CVX/VAG CYTO-IMP: NORMAL
HPV HR 12 DNA GENITAL QL NAA+PROBE: POSITIVE
HPV HR GENOTYPES PNL CVX NAA+PROBE: POSITIVE
HPV16 DNA SPEC QL NAA+PROBE: NEGATIVE
HPV18 DNA SPEC QL NAA+PROBE: NEGATIVE
LAB AP CONTRACEPTIVE HISTORY: NORMAL
LAB AP HPV GENOTYPE QUESTION: YES
LAB AP HPV HR: NORMAL
LABORATORY COMMENT REPORT: NORMAL
LMP START DATE: NORMAL
PATH REPORT.TOTAL CANCER: NORMAL

## 2025-01-27 ENCOUNTER — OFFICE VISIT (OUTPATIENT)
Dept: URGENT CARE | Age: 40
End: 2025-01-27
Payer: COMMERCIAL

## 2025-01-27 VITALS
HEIGHT: 64 IN | WEIGHT: 183 LBS | BODY MASS INDEX: 31.24 KG/M2 | SYSTOLIC BLOOD PRESSURE: 137 MMHG | TEMPERATURE: 97.8 F | OXYGEN SATURATION: 99 % | DIASTOLIC BLOOD PRESSURE: 84 MMHG | RESPIRATION RATE: 20 BRPM | HEART RATE: 82 BPM

## 2025-01-27 DIAGNOSIS — B96.89 ACUTE BACTERIAL BRONCHITIS: Primary | ICD-10-CM

## 2025-01-27 DIAGNOSIS — H66.002 NON-RECURRENT ACUTE SUPPURATIVE OTITIS MEDIA OF LEFT EAR WITHOUT SPONTANEOUS RUPTURE OF TYMPANIC MEMBRANE: ICD-10-CM

## 2025-01-27 DIAGNOSIS — J20.8 ACUTE BACTERIAL BRONCHITIS: Primary | ICD-10-CM

## 2025-01-27 PROCEDURE — 1036F TOBACCO NON-USER: CPT | Performed by: SURGERY

## 2025-01-27 PROCEDURE — 99213 OFFICE O/P EST LOW 20 MIN: CPT | Performed by: SURGERY

## 2025-01-27 PROCEDURE — 3008F BODY MASS INDEX DOCD: CPT | Performed by: SURGERY

## 2025-01-27 PROCEDURE — 99070 SPECIAL SUPPLIES PHYS/QHP: CPT | Performed by: SURGERY

## 2025-01-27 RX ORDER — MAGNESIUM OXIDE 200 MG
TABLET,CHEWABLE ORAL EVERY 24 HOURS
COMMUNITY

## 2025-01-27 RX ORDER — AMOXICILLIN AND CLAVULANATE POTASSIUM 875; 125 MG/1; MG/1
1 TABLET, FILM COATED ORAL 2 TIMES DAILY
Qty: 20 TABLET | Refills: 0 | Status: SHIPPED | OUTPATIENT
Start: 2025-01-27 | End: 2025-02-06

## 2025-01-27 NOTE — PROGRESS NOTES
Chief Complaint   Patient presents with    Sinus Problem    Cough    Earache     Pt c/o cough, sinus pressure/drainage, bilateral ears muffled x 15 days. Self test covid 7 days ago, negative. Taking otc, not helping much.       Physical Exam:     GEN: No acute distress    ENT: Left tympanic membrane erythematous, canal unremarkable. Sinus congestion present but sinuses non-tender. Pharynx and tonsils mildly hyperemic but without exudate.     Resp: Lungs clear to auscultation bilaterally           Assessment:     Encounter Diagnoses   Name Primary?    Acute bacterial bronchitis Yes    Non-recurrent acute suppurative otitis media of left ear without spontaneous rupture of tympanic membrane           Medical Decision Making & Plan:     Augmentin         01/27/25 at 9:33 AM - Karyna Weldon, DO

## 2025-01-30 PROCEDURE — RXMED WILLOW AMBULATORY MEDICATION CHARGE

## 2025-01-31 ENCOUNTER — PHARMACY VISIT (OUTPATIENT)
Dept: PHARMACY | Facility: CLINIC | Age: 40
End: 2025-01-31
Payer: COMMERCIAL

## 2025-02-12 ENCOUNTER — SPECIALTY PHARMACY (OUTPATIENT)
Dept: PHARMACY | Facility: CLINIC | Age: 40
End: 2025-02-12

## 2025-02-24 NOTE — PROGRESS NOTES
HPI    38 yo with history of Migraines, Anxiety/Depression, Hirsutism, diagnosed with PCOS in her 20's. Recalls taking Spironolactone, dose unsure, stopped because she did not think it helped with the hair growth. She does not have a period, IUD in place, has had tubal ligation.    -no gestational dm hx    Was tolerating max dose Metformin ER, and tolerating/hard for pt to swallow pills.  -pt notices no difference off it   -pt was taking aldactone 100mg, no acne, some hirsutism (no real improvement with aldactone)  -has mild facial hirsutisum      Current Outpatient Medications:     DULoxetine (Cymbalta) 60 mg DR capsule, Take 1 capsule (60 mg) by mouth once daily. Do not crush or chew. (Patient not taking: Reported on 1/27/2025), Disp: 90 capsule, Rfl: 3    levonorgestrel (Mirena) 20 mcg/24hr IUD, as directed Intrauterine, Disp: , Rfl:     magnesium oxide 200 mg magnesium tablet,chewable, once every 24 hours., Disp: , Rfl:     metFORMIN  mg 24 hr tablet, Take 2 tablets (1,000 mg) by mouth 2 times a day with meals. Do not crush, chew, or split. (Patient not taking: Reported on 2/25/2025), Disp: 360 tablet, Rfl: 2    nitrofurantoin, macrocrystal-monohydrate, (Macrobid) 100 mg capsule, Take 1 capsule (100 mg) by mouth once daily., Disp: , Rfl:     omeprazole (PriLOSEC) 40 mg DR capsule, TAKE ONE CAPSULE BY MOUTH EVERY DAY, Disp: 30 capsule, Rfl: 0    ondansetron (Zofran) 4 mg tablet, Take 1 tablet (4 mg) by mouth every 8 hours if needed for vomiting or nausea (migraine) for up to 20 doses., Disp: 20 tablet, Rfl: 0    rimegepant (Nurtec ODT) 75 mg tablet,disintegrating, Take 1 tablet (75 mg) by mouth every other day., Disp: 45 tablet, Rfl: 3      Allergies as of 02/25/2025    (No Known Allergies)         Review of Systems   Cardiology: Lightheadedness-denies.  Chest pain-denies.  Leg edema-denies.  Palpitations-denies.  Respiratory: Cough-denies. Shortness of breath-denies.  Wheezing-denies.  Gastroenterology:  "Constipation-denies.  Diarrhea-denies.  Heartburn-denies.  Endocrinology: Cold intolerance-denies.  Heat intolerance-denies.  Sweats-denies.  Neurology: Headache-denies.  Tremor-denies.  Neuropathy in extremities-denies.  Psychology: Low energy-denies.  Irritability-denies.  Sleep disturbances-denies.      /77 (BP Location: Left arm, Patient Position: Sitting, BP Cuff Size: Adult)   Pulse 63   Wt 80.6 kg (177 lb 9.6 oz)   LMP  (LMP Unknown)   BMI 30.48 kg/m²       Labs:  Lab Results   Component Value Date    WBC 10.3 02/22/2024    NRBC 0.0 02/22/2024    RBC 5.07 02/22/2024    HGB 15.0 02/22/2024    HCT 45.8 02/22/2024     02/22/2024     Lab Results   Component Value Date    CALCIUM 9.9 08/28/2024    AST 21 02/22/2024    ALKPHOS 60 02/22/2024    BILITOT 0.5 02/22/2024    PROT 7.6 02/22/2024    ALBUMIN 4.9 02/22/2024    GLOB 2.6 02/09/2023    AGR 1.8 02/09/2023     08/28/2024    K 4.0 08/28/2024     08/28/2024    CO2 26 08/28/2024    ANIONGAP 12 08/28/2024    BUN 11 08/28/2024    CREATININE 0.90 08/28/2024    UREACREAUR 19.0 02/09/2023    GLUCOSE 99 08/28/2024    ALT 30 02/22/2024    EGFR 84 08/28/2024     Lab Results   Component Value Date    CHOL 205 (H) 02/22/2024    TRIG 88 02/22/2024    HDL 46.0 (L) 02/22/2024    LDLCALC 141 (H) 02/22/2024     No results found for: \"MICROALBCREA\"  Lab Results   Component Value Date    TSH 1.13 02/22/2024     Lab Results   Component Value Date    KBZYERTC93 450 06/07/2021     Lab Results   Component Value Date    HGBA1C 5.4 02/22/2024         Physical Exam   General Appearance: pleasant, cooperative, no acute distress  HEENT: no chemosis, no proptosis, no lid lag, no lid retraction  Neck: no lymphadenopathy, no thyromegaly, no dominant thyroid nodules  Heart: no murmurs, regular rate and rhythm, S1 and S2  Lungs: no wheezes, no rhonci, no rales  Extremities: no lower extremity swelling      Assessment/Plan   1. Hirsutism (Primary)  -off aldactone no " real change in facial hirsutism  -fine to stay off this if no real benefit    2. PCOS (polycystic ovarian syndrome)  -recommend yearly A1c/fasting sugar/lipid/bp screens  -can have with yearly physical with pcp    -carb control in the diet, regular physical activity, wt management    -if not having any benefit from metformin can continue to hold  -can consider restarting if A1c gets into a borderline DM range         Follow Up:  Prmarcin Melton, can follow with pcp on these issues    -labs/tests/notes reviewed  -reviewed and counseled patient on medication monitoring and side effects

## 2025-02-25 ENCOUNTER — APPOINTMENT (OUTPATIENT)
Dept: ENDOCRINOLOGY | Facility: CLINIC | Age: 40
End: 2025-02-25
Payer: COMMERCIAL

## 2025-02-25 VITALS
WEIGHT: 177.6 LBS | BODY MASS INDEX: 30.48 KG/M2 | HEART RATE: 63 BPM | SYSTOLIC BLOOD PRESSURE: 116 MMHG | DIASTOLIC BLOOD PRESSURE: 77 MMHG

## 2025-02-25 DIAGNOSIS — L68.0 HIRSUTISM: Primary | ICD-10-CM

## 2025-02-25 DIAGNOSIS — E28.2 PCOS (POLYCYSTIC OVARIAN SYNDROME): ICD-10-CM

## 2025-02-25 PROCEDURE — 99213 OFFICE O/P EST LOW 20 MIN: CPT | Performed by: INTERNAL MEDICINE

## 2025-02-25 PROCEDURE — 1036F TOBACCO NON-USER: CPT | Performed by: INTERNAL MEDICINE

## 2025-02-25 ASSESSMENT — PATIENT HEALTH QUESTIONNAIRE - PHQ9
1. LITTLE INTEREST OR PLEASURE IN DOING THINGS: SEVERAL DAYS
2. FEELING DOWN, DEPRESSED OR HOPELESS: SEVERAL DAYS
SUM OF ALL RESPONSES TO PHQ9 QUESTIONS 1 AND 2: 2
10. IF YOU CHECKED OFF ANY PROBLEMS, HOW DIFFICULT HAVE THESE PROBLEMS MADE IT FOR YOU TO DO YOUR WORK, TAKE CARE OF THINGS AT HOME, OR GET ALONG WITH OTHER PEOPLE: SOMEWHAT DIFFICULT

## 2025-02-25 ASSESSMENT — ENCOUNTER SYMPTOMS: DEPRESSION: 1

## 2025-02-25 ASSESSMENT — PAIN SCALES - GENERAL: PAINLEVEL_OUTOF10: 0-NO PAIN

## 2025-03-12 PROCEDURE — 88305 TISSUE EXAM BY PATHOLOGIST: CPT

## 2025-03-13 ENCOUNTER — LAB REQUISITION (OUTPATIENT)
Dept: LAB | Facility: HOSPITAL | Age: 40
End: 2025-03-13
Payer: COMMERCIAL

## 2025-03-13 DIAGNOSIS — R87.810 CERVICAL HIGH RISK HUMAN PAPILLOMAVIRUS (HPV) DNA TEST POSITIVE: ICD-10-CM

## 2025-03-13 DIAGNOSIS — R87.612 LOW GRADE SQUAMOUS INTRAEPITHELIAL LESION ON CYTOLOGIC SMEAR OF CERVIX (LGSIL): ICD-10-CM

## 2025-03-17 LAB
LABORATORY COMMENT REPORT: NORMAL
PATH REPORT.COMMENTS IMP SPEC: NORMAL
PATH REPORT.FINAL DX SPEC: NORMAL
PATH REPORT.GROSS SPEC: NORMAL
PATH REPORT.RELEVANT HX SPEC: NORMAL
PATH REPORT.TOTAL CANCER: NORMAL

## 2025-04-02 ENCOUNTER — OFFICE VISIT (OUTPATIENT)
Dept: PRIMARY CARE | Facility: CLINIC | Age: 40
End: 2025-04-02
Payer: COMMERCIAL

## 2025-04-02 VITALS
RESPIRATION RATE: 19 BRPM | WEIGHT: 180 LBS | OXYGEN SATURATION: 98 % | DIASTOLIC BLOOD PRESSURE: 80 MMHG | SYSTOLIC BLOOD PRESSURE: 118 MMHG | BODY MASS INDEX: 30.9 KG/M2 | HEART RATE: 75 BPM

## 2025-04-02 DIAGNOSIS — S46.812A STRAIN OF LEFT TRAPEZIUS MUSCLE, INITIAL ENCOUNTER: Primary | ICD-10-CM

## 2025-04-02 PROCEDURE — 99213 OFFICE O/P EST LOW 20 MIN: CPT | Performed by: FAMILY MEDICINE

## 2025-04-02 RX ORDER — CYCLOBENZAPRINE HCL 10 MG
10 TABLET ORAL 3 TIMES DAILY PRN
Qty: 30 TABLET | Refills: 0 | Status: SHIPPED | OUTPATIENT
Start: 2025-04-02 | End: 2025-06-01

## 2025-04-02 ASSESSMENT — PATIENT HEALTH QUESTIONNAIRE - PHQ9
10. IF YOU CHECKED OFF ANY PROBLEMS, HOW DIFFICULT HAVE THESE PROBLEMS MADE IT FOR YOU TO DO YOUR WORK, TAKE CARE OF THINGS AT HOME, OR GET ALONG WITH OTHER PEOPLE: NOT DIFFICULT AT ALL
2. FEELING DOWN, DEPRESSED OR HOPELESS: SEVERAL DAYS
1. LITTLE INTEREST OR PLEASURE IN DOING THINGS: SEVERAL DAYS
SUM OF ALL RESPONSES TO PHQ9 QUESTIONS 1 AND 2: 2

## 2025-04-02 ASSESSMENT — PAIN SCALES - GENERAL: PAINLEVEL_OUTOF10: 4

## 2025-04-02 ASSESSMENT — COLUMBIA-SUICIDE SEVERITY RATING SCALE - C-SSRS: 1. IN THE PAST MONTH, HAVE YOU WISHED YOU WERE DEAD OR WISHED YOU COULD GO TO SLEEP AND NOT WAKE UP?: NO

## 2025-04-02 NOTE — PROGRESS NOTES
Subjective   Patient ID: Stefany Glover is a 39 y.o. female who presents for Shoulder Pain (Pt c/o L shoulder pain that radiates to L wrist and L shoulder blade x 10 days. Cold and hot compresses did not alleviate pain.).    Shoulder Pain        10 days ago slept weird when she woke up she had some shoulder and upper arm stiffness at that time. Now having pain radiating from shoulder down lateral surface of arm and back behind shoulder blade on left side. Paresthesias down arm into all fingers. Pain is worse when arm is dangling, better holding arm up in sling position. Notes that internal rotation of shoulder helps pain feel better. Movement doesn't seem to aggravate pain.   She has tried NSAIDs, stretches, hot and cold compresses. They provide some temporary relief.     Review of Systems  All other systems have been reviewed and are negative except as noted in the HPI.       Objective   /80   Pulse 75   Resp 19   Wt 81.6 kg (180 lb)   SpO2 98%   BMI 30.90 kg/m²     Physical Exam  Constitutional:       Appearance: Normal appearance.   Cardiovascular:      Rate and Rhythm: Normal rate and regular rhythm.      Pulses: Normal pulses.      Heart sounds: Normal heart sounds. No murmur heard.  Pulmonary:      Effort: Pulmonary effort is normal.      Breath sounds: Normal breath sounds.   Musculoskeletal:         General: No tenderness or signs of injury. Normal range of motion.      Comments: Negative empty can test  When raising arm laterally starts to have pain at about 135 degrees  Full ROM but some pain  Deltoid mildly tender to palpation  Moderate muscle tenderness around L scapula   Skin:     Capillary Refill: Capillary refill takes less than 2 seconds.   Neurological:      General: No focal deficit present.      Mental Status: She is alert and oriented to person, place, and time.      Sensory: No sensory deficit.      Motor: No weakness.   Psychiatric:         Mood and Affect: Mood normal.          Behavior: Behavior normal.         Assessment/Plan   Assessment & Plan  Strain of left trapezius muscle, initial encounter    History and exam most consistent with strain of left trapezius causing mild nerving compression. Will do 5 days of flexeril. Discussed stretching exercises as well. Heat to the area TID.  If no improvement in another week, will refer to PT    Lance Rivera  MS3    I was present with the medical student who participated in the documentation of this note. I have personally seen and examined the patient and performed the medical decision-making components. I have reviewed the medical student documentation and verified the findings in the note as written with additions or exceptions as stated in the body of the note.  Taylor López MD

## 2025-05-07 ENCOUNTER — SPECIALTY PHARMACY (OUTPATIENT)
Dept: PHARMACY | Facility: CLINIC | Age: 40
End: 2025-05-07

## 2025-06-09 ENCOUNTER — APPOINTMENT (OUTPATIENT)
Dept: NEUROLOGY | Facility: CLINIC | Age: 40
End: 2025-06-09
Payer: COMMERCIAL

## 2025-06-09 VITALS
HEART RATE: 84 BPM | RESPIRATION RATE: 18 BRPM | DIASTOLIC BLOOD PRESSURE: 82 MMHG | WEIGHT: 175.27 LBS | HEIGHT: 63 IN | BODY MASS INDEX: 31.05 KG/M2 | SYSTOLIC BLOOD PRESSURE: 133 MMHG

## 2025-06-09 DIAGNOSIS — G43.109 MIGRAINE WITH AURA AND WITHOUT STATUS MIGRAINOSUS, NOT INTRACTABLE: Primary | ICD-10-CM

## 2025-06-09 PROCEDURE — 3008F BODY MASS INDEX DOCD: CPT | Performed by: PSYCHIATRY & NEUROLOGY

## 2025-06-09 PROCEDURE — 99213 OFFICE O/P EST LOW 20 MIN: CPT | Performed by: PSYCHIATRY & NEUROLOGY

## 2025-06-09 PROCEDURE — 1036F TOBACCO NON-USER: CPT | Performed by: PSYCHIATRY & NEUROLOGY

## 2025-06-09 RX ORDER — ONDANSETRON 4 MG/1
8 TABLET, FILM COATED ORAL EVERY 8 HOURS PRN
Qty: 20 TABLET | Refills: 11 | Status: SHIPPED | OUTPATIENT
Start: 2025-06-09

## 2025-06-09 RX ORDER — PROMETHAZINE HYDROCHLORIDE 25 MG/1
25 TABLET ORAL EVERY 8 HOURS PRN
Qty: 10 TABLET | Refills: 11 | Status: SHIPPED | OUTPATIENT
Start: 2025-06-09 | End: 2025-10-07

## 2025-06-09 ASSESSMENT — PAIN SCALES - GENERAL: PAINLEVEL_OUTOF10: 8
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
## 2025-06-09 NOTE — PATIENT INSTRUCTIONS
Keep a calendar of your migraines.    Wear hair loose.      Exercise regularly, get plenty of fresh air and regular, sleep.    Preventative medication should be taken every day regardless of whether or not, you have a headache.  It is to prevent headaches.    Medication to treat the actual headache should be taken as soon as you realize you have a headache.    Medications such as Maxalt, Imitrex, Relpax, Zomig, can be taken only twice a day and they're only nine pills per month.  If you have frequent headaches may need to save these for the worst headaches.    Do not take pain medication every day.  Your body can become addicted to this medication and may make the headaches worse.  If you are having headaches every day, we need to increase the preventative medication, call me if this is becoming a problem.    Avoid foods that can cause migraines.  These include: Red wine, MSG, nitrates (hot dogs, or lunch meats), caffeine, chocolate, cheese.    Monosodium Glutamate (MSG)  MSG also known as Monosodium glutamate is a common food additive. MSG is used to make food taste better, and can be found in processed products, fast food, and canned soups. MSG can cause headaches when consumed in large amounts.    Fast Foods that Contain MSG  Ch[employer]'s Chicken [place]   [employer]'s Extra Crispy Chicken Breast    Chips  (Will be listed on ingredient list Monosodium Glutamate)  Doritos  Pringles  Potato Chips    Meats  Hot dogs    Lunch Meats  Beef jerky    Sausages  Smoked meats    Pepperoni  Meat snack sticks    [place]  Patrasmi    Hamburgers  Cold cuts    Salami  Fried Chicken      Chicken Nuggets  Burgers    Sauces  Ketchup    Mayonnaise  Barbecue sauce   Salad dressing  Soy sauce    Mustard    Miscellaneous  Bodybuilding protein powder  Instant Ramen  Spices    Items that cause headaches without MSG  Alcohol (red wine, beer, whiskey, Scotch, and champagne )  Cheese (Aged Cheeses: including Parmesan, Swiss,  Janet Sharma    I can be reached at phone: [phone] or email: [email or web address] or message me on [employer]

## 2025-08-24 ENCOUNTER — OFFICE VISIT (OUTPATIENT)
Dept: URGENT CARE | Age: 40
End: 2025-08-24
Payer: COMMERCIAL

## 2025-08-24 VITALS
WEIGHT: 180 LBS | DIASTOLIC BLOOD PRESSURE: 88 MMHG | BODY MASS INDEX: 30.73 KG/M2 | TEMPERATURE: 98 F | HEART RATE: 82 BPM | HEIGHT: 64 IN | SYSTOLIC BLOOD PRESSURE: 139 MMHG | RESPIRATION RATE: 18 BRPM | OXYGEN SATURATION: 100 %

## 2025-08-24 DIAGNOSIS — B96.89 BACTERIAL SINUSITIS: Primary | ICD-10-CM

## 2025-08-24 DIAGNOSIS — J40 BRONCHITIS: ICD-10-CM

## 2025-08-24 DIAGNOSIS — J32.9 BACTERIAL SINUSITIS: Primary | ICD-10-CM

## 2025-08-24 PROCEDURE — 3008F BODY MASS INDEX DOCD: CPT | Performed by: PHYSICIAN ASSISTANT

## 2025-08-24 PROCEDURE — 1036F TOBACCO NON-USER: CPT | Performed by: PHYSICIAN ASSISTANT

## 2025-08-24 PROCEDURE — 99213 OFFICE O/P EST LOW 20 MIN: CPT | Performed by: PHYSICIAN ASSISTANT

## 2025-08-24 RX ORDER — BENZONATATE 200 MG/1
200 CAPSULE ORAL 2 TIMES DAILY
Qty: 10 CAPSULE | Refills: 0 | Status: SHIPPED | OUTPATIENT
Start: 2025-08-24 | End: 2025-08-29

## 2025-08-24 RX ORDER — AMOXICILLIN AND CLAVULANATE POTASSIUM 875; 125 MG/1; MG/1
875 TABLET, FILM COATED ORAL 2 TIMES DAILY
Qty: 14 TABLET | Refills: 0 | Status: SHIPPED | OUTPATIENT
Start: 2025-08-24 | End: 2025-08-31

## 2025-09-03 ENCOUNTER — APPOINTMENT (OUTPATIENT)
Dept: PRIMARY CARE | Facility: CLINIC | Age: 40
End: 2025-09-03
Payer: COMMERCIAL